# Patient Record
Sex: FEMALE | Race: WHITE | NOT HISPANIC OR LATINO | Employment: FULL TIME | URBAN - METROPOLITAN AREA
[De-identification: names, ages, dates, MRNs, and addresses within clinical notes are randomized per-mention and may not be internally consistent; named-entity substitution may affect disease eponyms.]

---

## 2018-08-28 ENCOUNTER — OFFICE VISIT - HEALTHEAST (OUTPATIENT)
Dept: FAMILY MEDICINE | Facility: CLINIC | Age: 58
End: 2018-08-28

## 2018-08-28 ENCOUNTER — COMMUNICATION - HEALTHEAST (OUTPATIENT)
Dept: TELEHEALTH | Facility: CLINIC | Age: 58
End: 2018-08-28

## 2018-08-28 DIAGNOSIS — R73.9 HYPERGLYCEMIA: ICD-10-CM

## 2018-08-28 DIAGNOSIS — E78.2 MIXED HYPERLIPIDEMIA: ICD-10-CM

## 2018-08-28 DIAGNOSIS — N95.2 ATROPHIC VAGINITIS: ICD-10-CM

## 2018-08-28 DIAGNOSIS — Z12.4 PAP SMEAR FOR CERVICAL CANCER SCREENING: ICD-10-CM

## 2018-08-28 DIAGNOSIS — Z72.0 TOBACCO USE: ICD-10-CM

## 2018-08-28 DIAGNOSIS — Z00.00 ROUTINE GENERAL MEDICAL EXAMINATION AT A HEALTH CARE FACILITY: ICD-10-CM

## 2018-08-28 LAB
ALBUMIN SERPL-MCNC: 4.3 G/DL (ref 3.5–5)
ALP SERPL-CCNC: 75 U/L (ref 45–120)
ALT SERPL W P-5'-P-CCNC: 20 U/L (ref 0–45)
ANION GAP SERPL CALCULATED.3IONS-SCNC: 8 MMOL/L (ref 5–18)
AST SERPL W P-5'-P-CCNC: 24 U/L (ref 0–40)
BILIRUB SERPL-MCNC: 0.5 MG/DL (ref 0–1)
BUN SERPL-MCNC: 9 MG/DL (ref 8–22)
CALCIUM SERPL-MCNC: 10.2 MG/DL (ref 8.5–10.5)
CHLORIDE BLD-SCNC: 105 MMOL/L (ref 98–107)
CHOLEST SERPL-MCNC: 252 MG/DL
CO2 SERPL-SCNC: 26 MMOL/L (ref 22–31)
CREAT SERPL-MCNC: 0.76 MG/DL (ref 0.6–1.1)
FASTING STATUS PATIENT QL REPORTED: YES
GFR SERPL CREATININE-BSD FRML MDRD: >60 ML/MIN/1.73M2
GLUCOSE BLD-MCNC: 92 MG/DL (ref 70–125)
HBA1C MFR BLD: 5.5 % (ref 3.5–6)
HDLC SERPL-MCNC: 65 MG/DL
LDLC SERPL CALC-MCNC: 173 MG/DL
POTASSIUM BLD-SCNC: 4.2 MMOL/L (ref 3.5–5)
PROT SERPL-MCNC: 7.3 G/DL (ref 6–8)
SODIUM SERPL-SCNC: 139 MMOL/L (ref 136–145)
TRIGL SERPL-MCNC: 71 MG/DL

## 2018-08-28 RX ORDER — ACETAMINOPHEN 160 MG/5ML
SUSPENSION, ORAL (FINAL DOSE FORM) ORAL
Status: SHIPPED | COMMUNITY
Start: 2014-07-15

## 2018-08-28 RX ORDER — NICOTINE 21 MG/24HR
1 PATCH, TRANSDERMAL 24 HOURS TRANSDERMAL EVERY 24 HOURS
Qty: 42 PATCH | Refills: 0 | Status: SHIPPED | OUTPATIENT
Start: 2018-08-28 | End: 2023-06-28

## 2018-08-28 RX ORDER — OMEGA-3-ACID ETHYL ESTERS 1 G/1
CAPSULE, LIQUID FILLED ORAL
Status: SHIPPED | COMMUNITY
Start: 2013-07-15

## 2018-08-28 RX ORDER — VITAMIN E 268 MG
400 CAPSULE ORAL
Status: SHIPPED | COMMUNITY
Start: 2014-07-15

## 2018-08-28 RX ORDER — ASPIRIN 81 MG/1
81 TABLET, CHEWABLE ORAL
Status: SHIPPED | COMMUNITY
Start: 2013-07-15

## 2018-08-28 RX ORDER — ASCORBIC ACID 500 MG
500 TABLET ORAL
Status: SHIPPED | COMMUNITY
Start: 2014-07-15

## 2018-08-28 ASSESSMENT — MIFFLIN-ST. JEOR: SCORE: 1343.33

## 2018-08-28 NOTE — ASSESSMENT & PLAN NOTE
Discussed etiology and treatment options. The patient is not interested in any hormone replacement, either topical or oral. She will start with regular use of vaginal moisturizer and lubricant with intercourse. I encouraged her to follow up if not improving and we should reconsider topical estrogen.

## 2018-08-28 NOTE — ASSESSMENT & PLAN NOTE
ASCVD 10 year risk 5%. The majority of this risk is related to her tobacco use. She is actively working on smoking cessation. Recommended Mediterranean style diet and recheck in one year.

## 2018-08-28 NOTE — ASSESSMENT & PLAN NOTE
The patient is interested in smoking cessation and she was congratulated on this decision. We discussed options for assistance in her efforts. She has decided on nicotine patches. When she stops smoking, she will start nicotine 21 mg patches daily for 6 weeks. Taper to 14 mg for 2-6 weeks, then 7 mg for 2-6 weeks, then off.

## 2018-08-29 LAB
HPV SOURCE: NORMAL
HUMAN PAPILLOMA VIRUS 16 DNA: NEGATIVE
HUMAN PAPILLOMA VIRUS 18 DNA: NEGATIVE
HUMAN PAPILLOMA VIRUS FINAL DIAGNOSIS: NORMAL
HUMAN PAPILLOMA VIRUS OTHER HR: NEGATIVE
SPECIMEN DESCRIPTION: NORMAL

## 2018-09-04 LAB
BKR LAB AP ABNORMAL BLEEDING: NO
BKR LAB AP BIRTH CONTROL/HORMONES: NORMAL
BKR LAB AP CERVICAL APPEARANCE: NORMAL
BKR LAB AP GYN ADEQUACY: NORMAL
BKR LAB AP GYN INTERPRETATION: NORMAL
BKR LAB AP HPV REFLEX: NORMAL
BKR LAB AP LMP: NORMAL
BKR LAB AP PATIENT STATUS: NORMAL
BKR LAB AP PREVIOUS ABNORMAL: NORMAL
BKR LAB AP PREVIOUS NORMAL: 2012
HIGH RISK?: NO
PATH REPORT.COMMENTS IMP SPEC: NORMAL
RESULT FLAG (HE HISTORICAL CONVERSION): NORMAL

## 2018-12-18 ENCOUNTER — COMMUNICATION - HEALTHEAST (OUTPATIENT)
Dept: FAMILY MEDICINE | Facility: CLINIC | Age: 58
End: 2018-12-18

## 2018-12-20 ENCOUNTER — COMMUNICATION - HEALTHEAST (OUTPATIENT)
Dept: FAMILY MEDICINE | Facility: CLINIC | Age: 58
End: 2018-12-20

## 2018-12-20 ENCOUNTER — AMBULATORY - HEALTHEAST (OUTPATIENT)
Dept: FAMILY MEDICINE | Facility: CLINIC | Age: 58
End: 2018-12-20

## 2018-12-20 DIAGNOSIS — Z12.31 ENCOUNTER FOR SCREENING MAMMOGRAM FOR BREAST CANCER: ICD-10-CM

## 2019-01-09 ENCOUNTER — COMMUNICATION - HEALTHEAST (OUTPATIENT)
Dept: FAMILY MEDICINE | Facility: CLINIC | Age: 59
End: 2019-01-09

## 2019-01-09 DIAGNOSIS — Z53.20 MAMMOGRAM DECLINED: ICD-10-CM

## 2019-04-05 ENCOUNTER — COMMUNICATION - HEALTHEAST (OUTPATIENT)
Dept: FAMILY MEDICINE | Facility: CLINIC | Age: 59
End: 2019-04-05

## 2021-06-01 VITALS — HEIGHT: 68 IN | BODY MASS INDEX: 24.55 KG/M2 | WEIGHT: 162 LBS

## 2021-06-16 PROBLEM — E78.2 MIXED HYPERLIPIDEMIA: Status: ACTIVE | Noted: 2018-09-03

## 2021-06-16 PROBLEM — Z72.0 TOBACCO USE: Status: ACTIVE | Noted: 2018-08-28

## 2021-06-16 PROBLEM — N95.2 ATROPHIC VAGINITIS: Status: ACTIVE | Noted: 2018-09-03

## 2021-06-16 PROBLEM — Z53.20 MAMMOGRAM DECLINED: Status: ACTIVE | Noted: 2019-01-10

## 2021-06-19 NOTE — LETTER
Letter by Jovita Galindo MD at      Author: Jovita Galindo MD Service: -- Author Type: --    Filed:  Encounter Date: 4/5/2019 Status: (Other)         Loren Gilliland   [address] [place] [address]      4/5/2019       Dear Loren,       In reviewing your records, we have determined a gap in your preventive services. Based on your age and health history, we recommend the follow service.       ? Mammogram        If you have had the service elsewhere, please contact us so we can update our records. Please let us know if you have transferred your care to another clinic.    Please call [phone] to schedule this appointment.    We believe that a strong preventive care program, including regular physicals and follow-up care is an important part of a healthy lifestyle and we are committed to helping you maintain your health.    Thank you for choosing us as your health care provider.    Sincerely,     [facility]

## 2021-06-20 NOTE — PROGRESS NOTES
FEMALE PREVENTATIVE EXAM    Assessment and Plan:     Problem List Items Addressed This Visit     Atrophic vaginitis     Discussed etiology and treatment options. The patient is not interested in any hormone replacement, either topical or oral. She will start with regular use of vaginal moisturizer and lubricant with intercourse. I encouraged her to follow up if not improving and we should reconsider topical estrogen.         Tobacco use     The patient is interested in smoking cessation and she was congratulated on this decision. We discussed options for assistance in her efforts. She has decided on nicotine patches. When she stops smoking, she will start nicotine 21 mg patches daily for 6 weeks. Taper to 14 mg for 2-6 weeks, then 7 mg for 2-6 weeks, then off.         Relevant Medications    nicotine (NICODERM CQ) 21 mg/24 hr    nicotine (NICODERM CQ) 14 mg/24 hr    nicotine (NICODERM CQ) 7 mg/24 hr    Other Relevant Orders    Pneumococcal conjugate vaccine 13-valent 6wks-17yrs; >50yrs (Completed)      Other Visit Diagnoses     Routine general medical examination at a health care facility    -  Primary    Relevant Orders    Lipid Pope FASTING (Completed)    Pap smear for cervical cancer screening        Relevant Orders    Gynecologic Cytology (PAP Smear)    HPV High Risk DNA Cervical (Completed)    Hyperglycemia        Relevant Orders    Comprehensive Metabolic Panel (Completed)    Glycosylated Hemoglobin A1c (Completed)        Patient Instructions   1) We will notify you of lab results.  Consider signing up for my chart.  2) Start nicotine patches 21 mg daily for 6 weeks, then 14 mg daily for 2-6 weeks, then 7 mg daily for 2-6 weeks, then off.  3) Please let me know if you would like to have a mammogram.  4) Replense vaginal moisturizer daily.  Continue to use KY jelly with intercourse.     Subjective:   HPI:  Loren Gilliland is a 59 yo female who presents for a preventive health visit. The patient is also  complaining of pain with intercourse since she went through menopause around age 49. She reports that she has dryness and discomfort. No bleeding with intercourse.Artificial lubricants have been somewhat helpful.     The patient would also like to quit smoking. She is smoking about 1 1/2 ppd. She was able to quit successfully about two years ago. She did the patch at that time. She has signed up with the Quit plan and is finding the support helpful.      Patient Active Problem List   Diagnosis     Tobacco use     Atrophic vaginitis      No past medical history on file.   Past Surgical History:   Procedure Laterality Date     RI REMOVAL OF TONSILS,<13 Y/O      Description: Tonsillectomy;  Recorded: 12/09/2013;        Current Outpatient Prescriptions:      albuterol (PROAIR HFA;PROVENTIL HFA;VENTOLIN HFA) 90 mcg/actuation inhaler, Inhale 1-2 puffs., Disp: , Rfl:      ascorbic acid, vitamin C, (VITAMIN C) 500 MG tablet, Take 500 mg by mouth., Disp: , Rfl:      aspirin 81 mg chewable tablet, Chew 81 mg., Disp: , Rfl:      calcium-vitamin D (CALCIUM-VITAMIN D) 500 mg(1,250mg) -200 unit per tablet, Take 1 tablet by mouth 2 (two) times a day with meals., Disp: , Rfl:      cholecalciferol, vitamin D3, 1,000 unit tablet, Take 1,000 Units by mouth., Disp: , Rfl:      ginkgo biloba leaf extract 60 mg Tab, Take by mouth., Disp: , Rfl:      multivitamin (ONE A DAY) per tablet, Take 1 tablet by mouth., Disp: , Rfl:      omega-3 acid ethyl esters (LOVAZA) 1 gram capsule, Take by mouth., Disp: , Rfl:      pyridoxine, vitamin B6, (B-6) 100 MG tablet, Take 50 mg by mouth., Disp: , Rfl:      vitamin E 400 UNIT capsule, Take 400 Units by mouth., Disp: , Rfl:       Review of Systems   Constitutional: Negative for activity change, appetite change, fever and unexpected weight change.   HENT: Negative for congestion, hearing loss and sore throat.    Eyes: Negative for discharge and visual disturbance.   Respiratory: Negative for cough,  "shortness of breath and wheezing.    Cardiovascular: Negative for chest pain, palpitations and leg swelling.   Gastrointestinal: Negative for abdominal pain, blood in stool, constipation, diarrhea and vomiting.   Endocrine: Negative for polydipsia and polyuria.   Genitourinary: Negative for decreased urine volume, difficulty urinating, dysuria, frequency, hematuria and urgency.   Musculoskeletal: Negative for arthralgias, gait problem and myalgias.   Skin: Negative for rash.   Allergic/Immunologic: Negative for immunocompromised state.   Neurological: Negative for weakness.   Hematological: Does not bruise/bleed easily.   Psychiatric/Behavioral: Negative for dysphoric mood and sleep disturbance. The patient is not nervous/anxious.        Objective:   Vital Signs:   /64 (Patient Site: Left Arm, Patient Position: Sitting, Cuff Size: Adult Regular)  Pulse 81  Temp 98.1  F (36.7  C) (Oral)   Resp 16  Ht 5' 8\" (1.727 m)  Wt 162 lb (73.5 kg)  SpO2 96%  Breastfeeding? No  BMI 24.63 kg/m2     PHYSICAL EXAM  Physical Exam   Constitutional: She is oriented to person, place, and time. She appears well-developed and well-nourished. No distress.   HENT:   Right Ear: Tympanic membrane and ear canal normal.   Left Ear: Tympanic membrane and ear canal normal.   Mouth/Throat: Oropharynx is clear and moist.   Eyes: Conjunctivae and EOM are normal. Pupils are equal, round, and reactive to light.   Neck: Normal range of motion. Neck supple. No thyromegaly present.   Cardiovascular: Normal rate and regular rhythm.    No murmur heard.  Pulmonary/Chest: Effort normal and breath sounds normal. No respiratory distress. She has no wheezes. She has no rhonchi. Right breast exhibits no inverted nipple, no mass and no skin change. Left breast exhibits no inverted nipple, no mass and no skin change.   Abdominal: Soft. Bowel sounds are normal. She exhibits no distension and no mass. There is no hepatosplenomegaly. There is no " tenderness. Hernia confirmed negative in the ventral area.   Genitourinary: Uterus normal. Cervix exhibits no discharge and no friability. Right adnexum displays no mass. Left adnexum displays no mass.   Genitourinary Comments: Generalized thinning and atrophy of vaginal mucosa.  No lesions present.   Lymphadenopathy:     She has no cervical adenopathy.   Neurological: She is alert and oriented to person, place, and time. No cranial nerve deficit. Gait normal.   Reflex Scores:       Patellar reflexes are 2+ on the right side and 2+ on the left side.  Skin: Skin is warm. No rash noted.   Psychiatric: She has a normal mood and affect. Her behavior is normal. Judgment and thought content normal.

## 2021-06-22 NOTE — TELEPHONE ENCOUNTER
"Radiology schedulers spoke with patient on 12/21 to assist with scheduling her mammogram. Loren informed them that she \"does not do mammograms\", and that she would that removed from her medical record.  "

## 2021-08-22 ENCOUNTER — HEALTH MAINTENANCE LETTER (OUTPATIENT)
Age: 61
End: 2021-08-22

## 2021-10-17 ENCOUNTER — HEALTH MAINTENANCE LETTER (OUTPATIENT)
Age: 61
End: 2021-10-17

## 2021-12-14 ENCOUNTER — IMMUNIZATION (OUTPATIENT)
Dept: NURSING | Facility: CLINIC | Age: 61
End: 2021-12-14
Payer: COMMERCIAL

## 2021-12-14 PROCEDURE — 91300 PR COVID VAC PFIZER DIL RECON 30 MCG/0.3 ML IM: CPT

## 2021-12-14 PROCEDURE — 0004A PR COVID VAC PFIZER DIL RECON 30 MCG/0.3 ML IM: CPT

## 2021-12-30 ENCOUNTER — IMMUNIZATION (OUTPATIENT)
Dept: FAMILY MEDICINE | Facility: CLINIC | Age: 61
End: 2021-12-30
Payer: COMMERCIAL

## 2021-12-30 PROCEDURE — 90471 IMMUNIZATION ADMIN: CPT

## 2021-12-30 PROCEDURE — 90682 RIV4 VACC RECOMBINANT DNA IM: CPT

## 2022-01-23 ENCOUNTER — OFFICE VISIT (OUTPATIENT)
Dept: FAMILY MEDICINE | Facility: CLINIC | Age: 62
End: 2022-01-23
Payer: COMMERCIAL

## 2022-01-23 VITALS
WEIGHT: 161 LBS | DIASTOLIC BLOOD PRESSURE: 92 MMHG | RESPIRATION RATE: 18 BRPM | OXYGEN SATURATION: 99 % | TEMPERATURE: 97.5 F | SYSTOLIC BLOOD PRESSURE: 145 MMHG | HEART RATE: 71 BPM | BODY MASS INDEX: 24.48 KG/M2

## 2022-01-23 DIAGNOSIS — S62.101A CLOSED FRACTURE OF RIGHT WRIST, INITIAL ENCOUNTER: Primary | ICD-10-CM

## 2022-01-23 PROCEDURE — 99214 OFFICE O/P EST MOD 30 MIN: CPT | Performed by: FAMILY MEDICINE

## 2022-01-23 NOTE — PROGRESS NOTES
Assessment & Plan     Closed fracture of right wrist, initial encounter    - XR Wrist Right G/E 3 Views; Future  - Wrist/Arm/Hand Supplies Order for DME - ONLY FOR DME  - Orthopedic  Referral; Future    Xrays reviewed showing fracture of the radial metaphysis and ulnar styloid avulsion fx.  Placed in wrist splint today.  ORTHO follow up for casting.  Continue with REST/ICE/COMPRESSION and ELEVATION.  NSAIDs/Tylenol prn pain.    Brooke Yeh MD  [facility]    Alvin Pimentel is a 61 year old who presents for the following health issues     HPI     Tripped today while bringing in groceries.  Landed on RIGHT hand to catch fall.  Pain over RIGHT wrist with supination and pronation.  Mild swelling.  Here with .      Review of Systems   Constitutional, HEENT, cardiovascular, pulmonary, GI, , musculoskeletal, neuro, skin, endocrine and psych systems are negative, except as otherwise noted.      Objective    BP (!) 145/92   Pulse 71   Temp 97.5  F (36.4  C) (Oral)   Resp 18   Wt 73 kg (161 lb)   SpO2 99%   BMI 24.48 kg/m    Body mass index is 24.48 kg/m .  Physical Exam   GENERAL: healthy, alert and no distress  EYES: Eyes grossly normal to inspection, PERRL and conjunctivae and sclerae normal  MS: no gross musculoskeletal defects noted, mild swelling over RIGHT wrist over distal head of radius.  Pain when pronates and supinates the wrist and dorsiflexes.  SKIN: no suspicious lesions or rashes  PSYCH: mentation appears normal, affect normal/bright    Xray    EXAM: XR WRIST RIGHT G/E 3 VIEWS  LOCATION: [facility]  DATE/TIME: 01/23/2022, 3:13 PM     INDICATION: Right wrist pain.  COMPARISON: None.                                                                      IMPRESSION: There is a transverse fracture of the distal radial metaphysis with mild dorsal impaction. There is an ulnar styloid avulsion fracture as well.

## 2022-01-23 NOTE — PROGRESS NOTES
{PROVIDER CHARTING PREFERENCE:915599}    Alvin Pimentel is a 61 year old who presents for the following health issues {ACCOMPANIED BY STATEMENT (Optional):592413}    HPI     {SUPERLIST (Optional):178074}  {additonal problems for provider to add (Optional):189561}    Review of Systems   {ROS COMP (Optional):992193}      Objective    BP (!) 145/92   Pulse 71   Temp 97.5  F (36.4  C) (Oral)   Resp 18   Wt 73 kg (161 lb)   SpO2 99%   BMI 24.48 kg/m    Body mass index is 24.48 kg/m .  Physical Exam   {Exam List (Optional):171611}    {Diagnostic Test Results (Optional):811109}    {AMBULATORY ATTESTATION (Optional):224167}

## 2022-01-26 ENCOUNTER — TRANSFERRED RECORDS (OUTPATIENT)
Dept: HEALTH INFORMATION MANAGEMENT | Facility: CLINIC | Age: 62
End: 2022-01-26

## 2022-02-02 ENCOUNTER — TRANSFERRED RECORDS (OUTPATIENT)
Dept: HEALTH INFORMATION MANAGEMENT | Facility: CLINIC | Age: 62
End: 2022-02-02

## 2022-02-16 ENCOUNTER — TRANSFERRED RECORDS (OUTPATIENT)
Dept: HEALTH INFORMATION MANAGEMENT | Facility: CLINIC | Age: 62
End: 2022-02-16

## 2022-02-23 ENCOUNTER — TRANSFERRED RECORDS (OUTPATIENT)
Dept: HEALTH INFORMATION MANAGEMENT | Facility: CLINIC | Age: 62
End: 2022-02-23

## 2022-03-09 ENCOUNTER — TRANSFERRED RECORDS (OUTPATIENT)
Dept: HEALTH INFORMATION MANAGEMENT | Facility: CLINIC | Age: 62
End: 2022-03-09

## 2022-08-09 ENCOUNTER — IMMUNIZATION (OUTPATIENT)
Dept: NURSING | Facility: CLINIC | Age: 62
End: 2022-08-09
Payer: COMMERCIAL

## 2022-08-09 ENCOUNTER — TELEPHONE (OUTPATIENT)
Dept: NURSING | Facility: CLINIC | Age: 62
End: 2022-08-09

## 2022-08-09 PROCEDURE — 91305 COVID-19,PF,PFIZER (12+ YRS): CPT

## 2022-08-09 PROCEDURE — 0054A COVID-19,PF,PFIZER (12+ YRS): CPT

## 2022-08-09 NOTE — TELEPHONE ENCOUNTER
Telephone call  Patient calling to find out if she had been exposed to covid her  was exposed at [facility] .  Explained he should test in 5-7 days unless he comes down with symptoms before that.  She decided she needed the booster  So transferred her to scheduling for a appointment.    Roslyn Canas RN   [facility] Nurse Advisor  9:46 AM 8/9/2022    COVID 19 Nurse Triage Plan/Patient Instructions    Please be aware that novel coronavirus (COVID-19) may be circulating in the community. If you develop symptoms such as fever, cough, or SOB or if you have concerns about the presence of another infection including coronavirus (COVID-19), please contact your health care provider or visit [email or web address].     Disposition/Instructions    Home care recommended. Follow home care protocol based instructions.    Thank you for taking steps to prevent the spread of this virus.  o Limit your contact with others.  o Wear a simple mask to cover your cough.  o Wash your hands well and often.    Resources    [facility]: About COVID-19: [email or web address]    CDC: What to Do If You're Sick: www.cdc.gov/coronavirus/2019-ncov/about/steps-when-sick.html    CDC: Ending Home Isolation: www.cdc.gov/coronavirus/2019-ncov/hcp/disposition-in-home-patients.html     CDC: Caring for Someone: www.cdc.gov/coronavirus/2019-ncov/if-you-are-sick/care-for-someone.html     [facility]: Interim Guidance for Hospital Discharge to Home: www.health.[facility].us/diseases/coronavirus/hcp/hospdischarge.pdf    [facility] clinical trials (COVID-19 research studies): [email or web address]-clinical-trials     Below are the COVID-19 hotlines at the [facility] ([facility]). Interpreters are available.   o For health questions: Call 528-854-0030 or 1-783.578.5485 (7 a.m. to 7 p.m.)  o For questions about schools and childcare: Call 520-534-5766 or 1-926.545.2800 (7 a.m. to 7 p.m.)

## 2022-10-02 ENCOUNTER — HEALTH MAINTENANCE LETTER (OUTPATIENT)
Age: 62
End: 2022-10-02

## 2023-03-01 ENCOUNTER — NURSE TRIAGE (OUTPATIENT)
Dept: FAMILY MEDICINE | Facility: CLINIC | Age: 63
End: 2023-03-01
Payer: COMMERCIAL

## 2023-03-01 NOTE — TELEPHONE ENCOUNTER
03/01/23  COVID Positive/Requesting COVID treatment    Patient is positive for COVID and requesting treatment options.    Date of positive COVID test (PCR or at home)? 02/28/23  Current COVID symptoms: - fever or chills  - cough  - muscle or body aches  Date COVID symptoms began: 02/26/23    Message should be routed to clinic RN pool. Best phone number to use for call back: [phone]       Pt states she has no PCP but [facility] her primary clinic as she has been seen therer for vaccinations in the past.

## 2023-03-01 NOTE — TELEPHONE ENCOUNTER
S-(situation):   Pt had positive COVID test Tuesday, 02/28/2023. Pt would like advise and to know more about antiviral therapy.    B-(background):   02/26/2023 Symptom onset    A-(assessment):   Cough, mild, productive intermittently, white sputum  Body aches, moderate, constant. Taking Advil, effective.  T98.6F, oral. Highest temp in 24 hours 99.8F, oral.  Fatigue, taking lots of naps, consistent    Denies SOB, difficulty breathing, wheezing, nausea, vomiting, diarrhea, sore throat, loss of taste or smell    R-(recommendations):   Home care. Care advice given. Provided education on paxlovid. Pt would like to continue with home care. Noted pt's window for paxlovid would be Friday, 03/03/2023. Pt will call back if sx worsen or if they want to be considered for paxlovid.    Kate Brady RN    Reason for Disposition    [1] COVID-19 diagnosed by positive lab test (e.g., PCR, rapid self-test kit) AND [2] mild symptoms (e.g., cough, fever, others) AND [3] no complications or SOB    Additional Information    Negative: [1] Diagnosed or suspected COVID-19 AND [2] symptoms lasting 3 or more weeks    Negative: [1] COVID-19 exposure AND [2] no symptoms    Negative: COVID-19 vaccine reaction suspected (e.g., fever, headache, muscle aches) occurring 1 to 3 days after getting vaccine    Negative: COVID-19 vaccine, questions about    Negative: [1] Lives with someone known to have influenza (flu test positive) AND [2] flu-like symptoms (e.g., cough, runny nose, sore throat, SOB; with or without fever)    Negative: [1] Adult with possible COVID-19 symptoms AND [2] triager concerned about severity of symptoms or other causes    Negative: COVID-19 and breastfeeding, questions about    Negative: SEVERE difficulty breathing (e.g., struggling for each breath, speaks in single words)    Negative: Difficult to awaken or acting confused (e.g., disoriented, slurred speech)    Negative: Bluish (or gray) lips or face now    Negative:  Shock suspected (e.g., cold/pale/clammy skin, too weak to stand, low BP, rapid pulse)    Negative: Sounds like a life-threatening emergency to the triager    Negative: SEVERE or constant chest pain or pressure  (Exception: Mild central chest pain, present only when coughing.)    Negative: MODERATE difficulty breathing (e.g., speaks in phrases, SOB even at rest, pulse 100-120)    Negative: Headache and stiff neck (can't touch chin to chest)    Negative: Oxygen level (e.g., pulse oximetry) 90 percent or lower    Negative: Chest pain or pressure  (Exception: MILD central chest pain, present only when coughing)    Negative: Patient sounds very sick or weak to the triager    Negative: MILD difficulty breathing (e.g., minimal/no SOB at rest, SOB with walking, pulse <100)    Negative: Fever > 103 F (39.4 C)    Negative: [1] Fever > 101 F (38.3 C) AND [2] over 60 years of age    Negative: [1] Fever > 100.0 F (37.8 C) AND [2] bedridden (e.g., nursing home patient, CVA, chronic illness, recovering from surgery)    Negative: [1] HIGH RISK for severe COVID complications (e.g., weak immune system, age > 64 years, obesity with BMI of 30 or higher, pregnant, chronic lung disease or other chronic medical condition) AND [2] COVID symptoms (e.g., cough, fever)  (Exceptions: Already seen by PCP and no new or worsening symptoms.)    Negative: [1] HIGH RISK patient AND [2] influenza is widespread in the community AND [3] ONE OR MORE respiratory symptoms: cough, sore throat, runny or stuffy nose    Negative: [1] HIGH RISK patient AND [2] influenza exposure within the last 7 days AND [3] ONE OR MORE respiratory symptoms: cough, sore throat, runny or stuffy nose    Negative: Oxygen level (e.g., pulse oximetry) 91 to 94 percent    Negative: [1] COVID-19 infection suspected by caller or triager AND [2] mild symptoms (cough, fever, or others) AND [3] negative COVID-19 rapid test    Negative: Fever present > 3 days (72 hours)    Negative: [1]  "Fever returns after gone for over 24 hours AND [2] symptoms worse or not improved    Negative: [1] Continuous (nonstop) coughing interferes with work or school AND [2] no improvement using cough treatment per Care Advice    Negative: Cough present > 3 weeks    Negative: [1] COVID-19 diagnosed by positive lab test (e.g., PCR, rapid self-test kit) AND [2] NO symptoms (e.g., cough, fever, others)    Answer Assessment - Initial Assessment Questions  1. COVID-19 DIAGNOSIS: \"Who made your COVID-19 diagnosis?\" \"Was it confirmed by a positive lab test or self-test?\" If not diagnosed by a doctor (or NP/PA), ask \"Are there lots of cases (community spread) where you live?\" Note: See [facility] health department website, if unsure.      02/28/2023, home test positive  2. COVID-19 EXPOSURE: \"Was there any known exposure to COVID before the symptoms began?\" CDC Definition of close contact: within 6 feet (2 meters) for a total of 15 minutes or more over a 24-hour period.         3. ONSET: \"When did the COVID-19 symptoms start?\"       02/26/2023 afternoon  4. WORST SYMPTOM: \"What is your worst symptom?\" (e.g., cough, fever, shortness of breath, muscle aches)      Body aches, and fatigue  5. COUGH: \"Do you have a cough?\" If Yes, ask: \"How bad is the cough?\"        Cough, mild, productive, white sputum  6. FEVER: \"Do you have a fever?\" If Yes, ask: \"What is your temperature, how was it measured, and when did it start?\"      99.8F, oral  7. RESPIRATORY STATUS: \"Describe your breathing?\" (e.g., shortness of breath, wheezing, unable to speak)       denies  8. BETTER-SAME-WORSE: \"Are you getting better, staying the same or getting worse compared to yesterday?\"  If getting worse, ask, \"In what way?\"      Same as yesterday  9. HIGH RISK DISEASE: \"Do you have any chronic medical problems?\" (e.g., asthma, heart or lung disease, weak immune system, obesity, etc.)      denies  10. VACCINE: \"Have you had the COVID-19 vaccine?\" If Yes, ask: " "\"Which one, how many shots, when did you get it?\"        Yes, pfizer  11. BOOSTER: \"Have you received your COVID-19 booster?\" If Yes, ask: \"Which one and when did you get it?\"        Yes, pfizer  12. PREGNANCY: \"Is there any chance you are pregnant?\" \"When was your last menstrual period?\"        postmenopausal  13. OTHER SYMPTOMS: \"Do you have any other symptoms?\"  (e.g., chills, fatigue, headache, loss of smell or taste, muscle pain, sore throat)        Fever, body aches, fatigue  14. O2 SATURATION MONITOR:  \"Do you use an oxygen saturation monitor (pulse oximeter) at home?\" If Yes, ask \"What is your reading (oxygen level) today?\" \"What is your usual oxygen saturation reading?\" (e.g., 95%)        no    Protocols used: CORONAVIRUS (COVID-19) DIAGNOSED OR [id number]      "

## 2023-06-28 ENCOUNTER — OFFICE VISIT (OUTPATIENT)
Dept: FAMILY MEDICINE | Facility: CLINIC | Age: 63
End: 2023-06-28
Payer: COMMERCIAL

## 2023-06-28 VITALS
WEIGHT: 164 LBS | OXYGEN SATURATION: 98 % | SYSTOLIC BLOOD PRESSURE: 137 MMHG | BODY MASS INDEX: 24.94 KG/M2 | HEART RATE: 60 BPM | DIASTOLIC BLOOD PRESSURE: 81 MMHG

## 2023-06-28 DIAGNOSIS — Z12.31 VISIT FOR SCREENING MAMMOGRAM: ICD-10-CM

## 2023-06-28 DIAGNOSIS — Z72.0 TOBACCO USE: ICD-10-CM

## 2023-06-28 DIAGNOSIS — L82.1 SEBORRHEIC KERATOSES: ICD-10-CM

## 2023-06-28 DIAGNOSIS — J40 BRONCHITIS: Primary | ICD-10-CM

## 2023-06-28 PROCEDURE — 90471 IMMUNIZATION ADMIN: CPT | Performed by: FAMILY MEDICINE

## 2023-06-28 PROCEDURE — 90715 TDAP VACCINE 7 YRS/> IM: CPT | Performed by: FAMILY MEDICINE

## 2023-06-28 PROCEDURE — 17110 DESTRUCTION B9 LES UP TO 14: CPT | Performed by: FAMILY MEDICINE

## 2023-06-28 PROCEDURE — 90677 PCV20 VACCINE IM: CPT | Performed by: FAMILY MEDICINE

## 2023-06-28 PROCEDURE — 99213 OFFICE O/P EST LOW 20 MIN: CPT | Mod: 25 | Performed by: FAMILY MEDICINE

## 2023-06-28 PROCEDURE — 90472 IMMUNIZATION ADMIN EACH ADD: CPT | Performed by: FAMILY MEDICINE

## 2023-06-28 RX ORDER — ALBUTEROL SULFATE 90 UG/1
2 AEROSOL, METERED RESPIRATORY (INHALATION) EVERY 6 HOURS PRN
Qty: 18 G | Refills: 0 | Status: SHIPPED | OUTPATIENT
Start: 2023-06-28 | End: 2023-07-24

## 2023-06-28 RX ORDER — ALBUTEROL SULFATE 90 UG/1
2 AEROSOL, METERED RESPIRATORY (INHALATION) EVERY 6 HOURS PRN
COMMUNITY
End: 2023-06-28

## 2023-06-28 ASSESSMENT — PATIENT HEALTH QUESTIONNAIRE - PHQ9
SUM OF ALL RESPONSES TO PHQ QUESTIONS 1-9: 0
SUM OF ALL RESPONSES TO PHQ QUESTIONS 1-9: 0

## 2023-06-28 NOTE — PROGRESS NOTES
Assessment & Plan   Problem List Items Addressed This Visit        Musculoskeletal and Integumentary    Seborrheic keratoses     The patient has multiple relatively large seborrheic keratoses on her upper back and left upper arm.  These were treated with liquid nitrogen x3 deep freezes today as they are all quite itchy and bothersome to the patient.            Behavioral    Tobacco use     The patient reports that she is significantly cutting down with an intention to ultimately quit smoking.        Other Visit Diagnoses     Bronchitis    -  Primary    Relevant Medications    albuterol (PROAIR HFA/PROVENTIL HFA/VENTOLIN HFA) 108 (90 Base) MCG/ACT inhaler    Visit for screening mammogram        Relevant Orders    MA SCREENING DIGITAL BILAT - Future  (s+30)         The patient has gradually improving although persistent cough 3 weeks after what sounds like a viral upper respiratory illness.  She has a normal heart and lung exam today and was reassured that this is likely simply persistent inflammation of the bronchioles following infection.  I did refill her albuterol which she could try taking as needed considering she has had some relief and this certainly could be bronchitis.  I counseled the patient regarding smoking cessation as well as a number of preventive care opportunities that she is due for.  We assisted the patient in securing an appointment for her to follow-up for an annual physical exam in the near future.  I also placed an order for a mammogram and she received both an Adacel vaccine today as well as pneumonia.  [id number]     Nicotine/Tobacco Cessation:  She reports that she has been smoking cigarettes and cigarettes. She has been smoking an average of 1.5 packs per day. She has never used smokeless tobacco.  Nicotine/Tobacco Cessation Plan:       NOY UMANZOR MD  [facility]    Alvin Pimentel is a 63 year old presents today with a concern regarding a cough.  She  "states that this actually started almost 3 weeks ago.  She was feeling quite ill at that time and tried to make an appointment but was not able to get an appointment until today.  She states that her  was ill with upper respiratory symptoms and a cold similar and continues to cough as well.  The patient does have a history of tobacco use although has been trying to quit.  The patient states that initially she had significant nasal congestion as well as drainage and sinus pain and pressure.  She then had resolution of those symptoms but persistent cough.  The cough has gradually been getting better although continues at night to some extent.  She was given an albuterol inhaler in the past for bronchitis and tried using that and found it was helpful.  She is requesting a refill on that today.  The patient also notes that her sister was recently diagnosed with breast cancer.  Lastly, the patient does have some \"moles\" that she would like me to take a look at today.  They have been bothersome and quite itchy.      cold cough sinus issues for 2 weeks        6/28/2023     8:53 AM   Additional Questions   Roomed by as   Accompanied by self         6/28/2023     8:53 AM   Patient Reported Additional Medications   Patient reports taking the following new medications no     History of Present Illness       Reason for visit:  Really bad cold for over 2 weeks & check up  Symptom onset:  3-4 weeks ago  Symptoms include:  Runny nose headaches chills cough  Symptom intensity:  Severe  Symptom progression:  Improving  Had these symptoms before:  Yes  Has tried/received treatment for these symptoms:  Yes  Previous treatment was successful:  Yes  Prior treatment description:  Treated for broncitis  What makes it better:  Inhaller that I got before    She eats 2-3 servings of fruits and vegetables daily.She consumes 0 sweetened beverage(s) daily.She exercises with enough effort to increase her heart rate 30 to 60 minutes per " day.  She exercises with enough effort to increase her heart rate 6 days per week.   She is taking medications regularly.    Today's PHQ-9         PHQ-9 Total Score: 0    PHQ-9 Q9 Thoughts of better off dead/self-harm past 2 weeks :   Not at all               Objective    /81 (BP Location: Left arm, Patient Position: Left side, Cuff Size: Adult Large)   Pulse 60   Wt 74.4 kg (164 lb)   SpO2 98%   BMI 24.94 kg/m    Body mass index is 24.94 kg/m .  Physical Exam   GENERAL: healthy, alert and no distress  RESP: lungs clear to auscultation - no rales, rhonchi or wheezes  CV: regular rate and rhythm, normal S1 S2, no S3 or S4, no murmur, click or rub, no peripheral edema and peripheral pulses strong  SKIN: The patient has 3 smaller seborrheic keratoses on her upper back and 2 larger ones on her upper arm, left.  These were treated with liquid nitrogen x3 deep freezes today.

## 2023-06-28 NOTE — ASSESSMENT & PLAN NOTE
The patient reports that she is significantly cutting down with an intention to ultimately quit smoking.

## 2023-06-28 NOTE — ASSESSMENT & PLAN NOTE
The patient has multiple relatively large seborrheic keratoses on her upper back and left upper arm.  These were treated with liquid nitrogen x3 deep freezes today as they are all quite itchy and bothersome to the patient.

## 2023-07-24 ENCOUNTER — LAB (OUTPATIENT)
Dept: FAMILY MEDICINE | Facility: CLINIC | Age: 63
End: 2023-07-24

## 2023-07-24 ENCOUNTER — OFFICE VISIT (OUTPATIENT)
Dept: FAMILY MEDICINE | Facility: CLINIC | Age: 63
End: 2023-07-24
Payer: COMMERCIAL

## 2023-07-24 VITALS
HEART RATE: 59 BPM | SYSTOLIC BLOOD PRESSURE: 118 MMHG | HEIGHT: 68 IN | BODY MASS INDEX: 24.25 KG/M2 | RESPIRATION RATE: 16 BRPM | OXYGEN SATURATION: 99 % | DIASTOLIC BLOOD PRESSURE: 76 MMHG | TEMPERATURE: 98.1 F | WEIGHT: 160 LBS

## 2023-07-24 DIAGNOSIS — Z12.11 SCREEN FOR COLON CANCER: ICD-10-CM

## 2023-07-24 DIAGNOSIS — Z13.0 SCREENING FOR DEFICIENCY ANEMIA: ICD-10-CM

## 2023-07-24 DIAGNOSIS — Z12.4 CERVICAL CANCER SCREENING: ICD-10-CM

## 2023-07-24 DIAGNOSIS — Z72.0 TOBACCO USE: ICD-10-CM

## 2023-07-24 DIAGNOSIS — Z13.29 SCREENING FOR THYROID DISORDER: ICD-10-CM

## 2023-07-24 DIAGNOSIS — Z11.4 SCREENING FOR HIV (HUMAN IMMUNODEFICIENCY VIRUS): ICD-10-CM

## 2023-07-24 DIAGNOSIS — Z00.00 ROUTINE GENERAL MEDICAL EXAMINATION AT A HEALTH CARE FACILITY: Primary | ICD-10-CM

## 2023-07-24 DIAGNOSIS — E78.2 MIXED HYPERLIPIDEMIA: ICD-10-CM

## 2023-07-24 DIAGNOSIS — Z11.59 NEED FOR HEPATITIS C SCREENING TEST: ICD-10-CM

## 2023-07-24 DIAGNOSIS — Z13.228 SCREENING FOR METABOLIC DISORDER: ICD-10-CM

## 2023-07-24 DIAGNOSIS — Z13.1 SCREENING FOR DIABETES MELLITUS: ICD-10-CM

## 2023-07-24 PROBLEM — N95.2 ATROPHIC VAGINITIS: Status: RESOLVED | Noted: 2018-09-03 | Resolved: 2023-07-24

## 2023-07-24 PROBLEM — Z53.20 MAMMOGRAM DECLINED: Status: RESOLVED | Noted: 2019-01-10 | Resolved: 2023-07-24

## 2023-07-24 LAB
ALBUMIN SERPL BCG-MCNC: 4.8 G/DL (ref 3.5–5.2)
ALP SERPL-CCNC: 63 U/L (ref 35–104)
ALT SERPL W P-5'-P-CCNC: 20 U/L (ref 0–50)
ANION GAP SERPL CALCULATED.3IONS-SCNC: 13 MMOL/L (ref 7–15)
AST SERPL W P-5'-P-CCNC: 35 U/L (ref 0–45)
BILIRUB SERPL-MCNC: 0.3 MG/DL
BUN SERPL-MCNC: 15.1 MG/DL (ref 8–23)
CALCIUM SERPL-MCNC: 10.4 MG/DL (ref 8.8–10.2)
CHLORIDE SERPL-SCNC: 102 MMOL/L (ref 98–107)
CHOLEST SERPL-MCNC: 238 MG/DL
CREAT SERPL-MCNC: 0.9 MG/DL (ref 0.51–0.95)
DEPRECATED HCO3 PLAS-SCNC: 25 MMOL/L (ref 22–29)
ERYTHROCYTE [DISTWIDTH] IN BLOOD BY AUTOMATED COUNT: 13.1 % (ref 10–15)
GFR SERPL CREATININE-BSD FRML MDRD: 71 ML/MIN/1.73M2
GLUCOSE SERPL-MCNC: 92 MG/DL (ref 70–99)
HBA1C MFR BLD: 5.5 % (ref 0–5.6)
HCT VFR BLD AUTO: 39.8 % (ref 35–47)
HDLC SERPL-MCNC: 67 MG/DL
HGB BLD-MCNC: 13.6 G/DL (ref 11.7–15.7)
LDLC SERPL CALC-MCNC: 152 MG/DL
MCH RBC QN AUTO: 31.8 PG (ref 26.5–33)
MCHC RBC AUTO-ENTMCNC: 34.2 G/DL (ref 31.5–36.5)
MCV RBC AUTO: 93 FL (ref 78–100)
NONHDLC SERPL-MCNC: 171 MG/DL
PLATELET # BLD AUTO: 265 10E3/UL (ref 150–450)
POTASSIUM SERPL-SCNC: 4 MMOL/L (ref 3.4–5.3)
PROT SERPL-MCNC: 7.8 G/DL (ref 6.4–8.3)
RBC # BLD AUTO: 4.28 10E6/UL (ref 3.8–5.2)
SODIUM SERPL-SCNC: 140 MMOL/L (ref 136–145)
TRIGL SERPL-MCNC: 94 MG/DL
TSH SERPL DL<=0.005 MIU/L-ACNC: 0.96 UIU/ML (ref 0.3–4.2)
WBC # BLD AUTO: 7.3 10E3/UL (ref 4–11)

## 2023-07-24 PROCEDURE — 84443 ASSAY THYROID STIM HORMONE: CPT

## 2023-07-24 PROCEDURE — 80053 COMPREHEN METABOLIC PANEL: CPT

## 2023-07-24 PROCEDURE — 83036 HEMOGLOBIN GLYCOSYLATED A1C: CPT

## 2023-07-24 PROCEDURE — 85027 COMPLETE CBC AUTOMATED: CPT

## 2023-07-24 PROCEDURE — 87624 HPV HI-RISK TYP POOLED RSLT: CPT

## 2023-07-24 PROCEDURE — 99396 PREV VISIT EST AGE 40-64: CPT

## 2023-07-24 PROCEDURE — 87389 HIV-1 AG W/HIV-1&-2 AB AG IA: CPT

## 2023-07-24 PROCEDURE — 36415 COLL VENOUS BLD VENIPUNCTURE: CPT

## 2023-07-24 PROCEDURE — G0145 SCR C/V CYTO,THINLAYER,RESCR: HCPCS

## 2023-07-24 PROCEDURE — 86803 HEPATITIS C AB TEST: CPT

## 2023-07-24 PROCEDURE — 80061 LIPID PANEL: CPT

## 2023-07-24 ASSESSMENT — ENCOUNTER SYMPTOMS
JOINT SWELLING: 0
PALPITATIONS: 0
SHORTNESS OF BREATH: 0
SORE THROAT: 0
BREAST MASS: 0
DYSURIA: 0
FEVER: 0
ABDOMINAL PAIN: 0
CONSTIPATION: 0
HEMATURIA: 0
CHILLS: 0
FREQUENCY: 0
NAUSEA: 0
COUGH: 0
HEARTBURN: 0
DIARRHEA: 0
EYE PAIN: 0
MYALGIAS: 0
ARTHRALGIAS: 0
HEMATOCHEZIA: 0
HEADACHES: 0
NERVOUS/ANXIOUS: 0
PARESTHESIAS: 0
DIZZINESS: 0
WEAKNESS: 0

## 2023-07-24 NOTE — ASSESSMENT & PLAN NOTE
Currently smoking 10-15 cigarettes. Has successfully quit with nicotine replacement therapy (the patch) for over a year.  Her daily consumption is significantly less than what has been in the past.  At this point, she would like to continue trying to decrease consumption on her own, however I have encouraged her to reach out if she is in need of any medication management through nicotine replacement as she has done well on this in the past.

## 2023-07-24 NOTE — ASSESSMENT & PLAN NOTE
Annual exam.  Patient has active mammography order and was given the number to schedule this.  She is due for repeating colon cancer screening at the end of this year, and she has elected to pursue Cologuard.  Her last colonoscopy was normal and she has no risk factors that would disqualify her for Cologuard.  Pap smear was updated today; patient plans to discontinue screening after this.  She is eligible for lung cancer screening based on her smoking history, but patient would like to defer at this time and plan to readdress next year.  She is interested in the shingles vaccination, but would like to check with insurance to and plans to obtain this at a pharmacy if appropriate.  BMI is 24; discussed diet and exercise.  She would like a baseline labs done today, so CBC, CMP TSH and hemoglobin A1c were obtained.  HIV/hepatitis C screenings done today.  Discussed calcium and vitamin D as a pertains to bone health.  Follow-up in 1 year for annual exam or sooner with any acute concerns.

## 2023-07-24 NOTE — PATIENT INSTRUCTIONS
Check with insurance about coverage at a pharmacy for the Shingles vaccination.  Cologard for colon cancer  Schedule mammogram - call [phone]  Consider lung cancer screening. We can talk about this and screening for osteoporosis next year    Preventive Health Recommendations  Female Ages 50 - 64    Yearly exam: See your health care provider every year in order to  Review health changes.   Discuss preventive care.    Review your medicines if your doctor has prescribed any.    Get a Pap test every three years (unless you have an abnormal result and your provider advises testing more often).  If you get Pap tests with HPV test, you only need to test every 5 years, unless you have an abnormal result.   You do not need a Pap test if your uterus was removed (hysterectomy) and you have not had cancer.  You should be tested each year for STDs (sexually transmitted diseases) if you're at risk.   Have a mammogram every 1 to 2 years.  Have a colonoscopy at age 50, or have a yearly FIT test (stool test). These exams screen for colon cancer.    Have a cholesterol test every 5 years, or more often if advised.  Have a diabetes test (fasting glucose) every three years. If you are at risk for diabetes, you should have this test more often.   If you are at risk for osteoporosis (brittle bone disease), think about having a bone density scan (DEXA).    Shots: Get a flu shot each year. Get a tetanus shot every 10 years.    Nutrition:   Eat at least 5 servings of fruits and vegetables each day.  Eat whole-grain bread, whole-wheat pasta and brown rice instead of white grains and rice.  Get adequate Calcium and Vitamin D.     Lifestyle  Exercise at least 150 minutes a week (30 minutes a day, 5 days a week). This will help you control your weight and prevent disease.  Limit alcohol to one drink per day.  No smoking.   Wear sunscreen to prevent skin cancer.   See your dentist every six months for an exam and cleaning.  See your eye  doctor every 1 to 2 years.

## 2023-07-24 NOTE — PROGRESS NOTES
SUBJECTIVE:   CC: Loren is an 63 year old who presents for preventive health visit. She has no acute health concerns at today's visit, but would like to catch up with preventative medicine recommendations.        7/24/2023     9:19 AM   Additional Questions   Roomed by ac   Accompanied by self     Healthy Habits:     Getting at least 3 servings of Calcium per day:  Yes    Bi-annual eye exam:  Yes    Dental care twice a year:  Yes    Sleep apnea or symptoms of sleep apnea:  None    Diet:  Regular (no restrictions)    Frequency of exercise:  6-7 days/week    Duration of exercise:  30-45 minutes    Taking medications regularly:  Yes    Medication side effects:  None    Additional concerns today:  No    Walks dog (2 miles)  Swims at night  Not sedentary     Have you ever done Advance Care Planning? (For example, a Health Directive, POLST, or a discussion with a medical provider or your loved ones about your wishes): No, advance care planning information given to patient to review.  Advanced care planning was discussed at today's visit.    Social History     Tobacco Use    Smoking status: Every Day     Packs/day: 1.50     Types: Cigarettes    Smokeless tobacco: Never   Substance Use Topics    Alcohol use: No         7/24/2023     9:10 AM   Alcohol Use   Prescreen: >3 drinks/day or >7 drinks/week? No     Reviewed orders with patient.  Reviewed health maintenance and updated orders accordingly - Yes  Lab work is in process  Labs reviewed in EPIC  BP Readings from Last 3 Encounters:   07/24/23 118/76   06/28/23 137/81   01/23/22 (!) 145/92    Wt Readings from Last 3 Encounters:   07/24/23 72.6 kg (160 lb)   06/28/23 74.4 kg (164 lb)   01/23/22 73 kg (161 lb)              Patient Active Problem List   Diagnosis    Tobacco use    Mixed hyperlipidemia    Seborrheic keratoses    Routine general medical examination at a health care facility     Past Surgical History:   Procedure Laterality Date    HC REMOVAL OF TONSILS,<12  Y/O      Description: Tonsillectomy;  Recorded: 12/09/2013;       Social History     Tobacco Use    Smoking status: Every Day     Packs/day: 1.50     Types: Cigarettes    Smokeless tobacco: Never   Substance Use Topics    Alcohol use: No     Family History   Problem Relation Age of Onset    Lung Cancer Mother     Pneumonia Mother     No Known Problems Father     Uterine Cancer Sister 68    Breast Cancer Sister 68    No Known Problems Sister     No Known Problems Sister     No Known Problems Sister     No Known Problems Brother     No Known Problems Brother     No Known Problems Brother     No Known Problems Brother     Diabetes Type 2  Maternal Grandmother     Colon Cancer No family hx of     Coronary Artery Disease Early Onset No family hx of     Hypertension No family hx of     Hyperlipidemia No family hx of          Current Outpatient Medications   Medication Sig Dispense Refill    ascorbic acid, vitamin C, (VITAMIN C) 500 MG tablet [ASCORBIC ACID, VITAMIN C, (VITAMIN C) 500 MG TABLET] Take 500 mg by mouth.      aspirin 81 mg chewable tablet [ASPIRIN 81 MG CHEWABLE TABLET] Chew 81 mg.      calcium-vitamin D (CALCIUM-VITAMIN D) 500 mg(1,250mg) -200 unit per tablet [CALCIUM-VITAMIN D (CALCIUM-VITAMIN D) 500 MG(1,250MG) -200 UNIT PER TABLET] Take 1 tablet by mouth 2 (two) times a day with meals.      cholecalciferol, vitamin D3, 1,000 unit tablet [CHOLECALCIFEROL, VITAMIN D3, 1,000 UNIT TABLET] Take 1,000 Units by mouth.      ginkgo biloba leaf extract 60 mg Tab [GINKGO BILOBA LEAF EXTRACT 60 MG TAB] Take by mouth.      multivitamin (ONE A DAY) per tablet [MULTIVITAMIN (ONE A DAY) PER TABLET] Take 1 tablet by mouth.      omega-3 acid ethyl esters (LOVAZA) 1 gram capsule [OMEGA-3 ACID ETHYL ESTERS (LOVAZA) 1 GRAM CAPSULE] Take by mouth.      pyridoxine, vitamin B6, (B-6) 100 MG tablet [PYRIDOXINE, VITAMIN B6, (B-6) 100 MG TABLET] Take 50 mg by mouth.      vitamin E 400 UNIT capsule [VITAMIN E 400 UNIT CAPSULE] Take  400 Units by mouth.       No Known Allergies  Recent Labs   Lab Test 07/24/23  1010 08/28/18  1019   A1C 5.5 5.5   LDL  --  173*   HDL  --  65   TRIG  --  71   ALT  --  20   CR  --  0.76   GFRESTIMATED  --  >60   GFRESTBLACK  --  >60   POTASSIUM  --  4.2        Breast Cancer Screening:    [facility]-7:       7/24/2023     9:11 AM   Breast CA Risk Assessment ([facility]HS-7)   Did any of your first-degree relatives have breast or ovarian cancer? Yes   Did any of your relatives have bilateral breast cancer? Unknown   Did any man in your family have breast cancer? No   Did any woman in your family have breast and ovarian cancer? Yes   Did any woman in your family have breast cancer before age 50 y? No   Do you have 2 or more relatives with breast and/or ovarian cancer? No   Do you have 2 or more relatives with breast and/or bowel cancer? No       Mammogram Screening: Recommended mammography every 1-2 years with patient discussion and risk factor consideration  Pertinent mammograms are reviewed under the imaging tab.    History of abnormal Pap smear: NO - age 30-65 PAP every 5 years with negative HPV co-testing recommended      Latest Ref Rng & Units 8/28/2018    10:19 AM   PAP / HPV   PAP  Negative for squamous intraepithelial lesion or malignancy  Electronically signed by Angie Vásquez CT (ASCP) on 9/4/2018 at  1:15 PM      HPV 16 DNA NEG Negative    HPV 18 DNA NEG Negative    Other HR HPV NEG Negative      Reviewed and updated as needed this visit by clinical staff   Tobacco  Allergies  Meds  Problems  Med Hx  Surg Hx  Fam Hx          Reviewed and updated as needed this visit by Provider   Tobacco  Allergies  Meds  Problems  Med Hx  Surg Hx  Fam Hx           Review of Systems   Constitutional:  Negative for chills and fever.   HENT:  Negative for congestion, ear pain, hearing loss and sore throat.    Eyes:  Negative for pain and visual disturbance.   Respiratory:  Negative for cough and shortness of breath.   "  Cardiovascular:  Negative for chest pain, palpitations and peripheral edema.   Gastrointestinal:  Negative for abdominal pain, constipation, diarrhea, heartburn, hematochezia and nausea.   Breasts:  Negative for tenderness, breast mass and discharge.   Genitourinary:  Negative for dysuria, frequency, genital sores, hematuria, pelvic pain, urgency, vaginal bleeding and vaginal discharge.   Musculoskeletal:  Negative for arthralgias, joint swelling and myalgias.   Skin:  Negative for rash.   Neurological:  Negative for dizziness, weakness, headaches and paresthesias.   Psychiatric/Behavioral:  Negative for mood changes. The patient is not nervous/anxious.       OBJECTIVE:   /76 (BP Location: Left arm, Patient Position: Sitting, Cuff Size: Adult Regular)   Pulse 59   Temp 98.1  F (36.7  C) (Oral)   Resp 16   Ht 1.727 m (5' 8\")   Wt 72.6 kg (160 lb)   SpO2 99%   BMI 24.33 kg/m      Physical Exam  GENERAL: healthy, alert and no distress  EYES: Eyes grossly normal to inspection, PERRL and conjunctivae and sclerae normal  HENT: ear canals and TM's normal, nose and mouth without ulcers or lesions  NECK: no adenopathy, no asymmetry, masses, or scars and thyroid normal to palpation  RESP: lungs clear to auscultation - no rales, rhonchi or wheezes  BREAST: Declined by patient secondary to upcoming mammogram; discussed breast awareness  CV: regular rate and rhythm, normal S1 S2, no S3 or S4, no murmur, click or rub, no peripheral edema and peripheral pulses strong  ABDOMEN: soft, nontender, no hepatosplenomegaly, no masses and bowel sounds normal   (female): normal female external genitalia, normal urethral meatus, vaginal mucosa pink, moist, well rugated, and normal cervix/adnexa/uterus without masses or discharge   (female): normal female external genitalia, normal urethral meatus , vaginal mucosal atrophy, and normal cervix, adnexae, and uterus without masses.  MS: no gross musculoskeletal defects noted, " no edema  SKIN: no suspicious lesions or rashes  NEURO: Normal strength and tone, mentation intact and speech normal  PSYCH: mentation appears normal, affect normal/bright    ASSESSMENT/PLAN:     Problem List Items Addressed This Visit          Endocrine    Mixed hyperlipidemia     Not currently on any LDL lowering agents.  We will obtain nonfasting labs today.           Relevant Orders    Lipid Profile (Chol, Trig, HDL, LDL calc)       Behavioral    Tobacco use     Currently smoking 10-15 cigarettes. Has successfully quit with nicotine replacement therapy (the patch) for over a year.  Her daily consumption is significantly less than what has been in the past.  At this point, she would like to continue trying to decrease consumption on her own, however I have encouraged her to reach out if she is in need of any medication management through nicotine replacement as she has done well on this in the past.            Other    Routine general medical examination at a health care facility - Primary     Annual exam.  Patient has active mammography order and was given the number to schedule this.  She is due for repeating colon cancer screening at the end of this year, and she has elected to pursue Cologuard.  Her last colonoscopy was normal and she has no risk factors that would disqualify her for Cologuard.  Pap smear was updated today; patient plans to discontinue screening after this.  She is eligible for lung cancer screening based on her smoking history, but patient would like to defer at this time and plan to readdress next year.  She is interested in the shingles vaccination, but would like to check with insurance to and plans to obtain this at a pharmacy if appropriate.  BMI is 24; discussed diet and exercise.  She would like a baseline labs done today, so CBC, CMP TSH and hemoglobin A1c were obtained.  HIV/hepatitis C screenings done today.  Discussed calcium and vitamin D as a pertains to bone health.  Follow-up  in 1 year for annual exam or sooner with any acute concerns.          Other Visit Diagnoses       Screening for HIV (human immunodeficiency virus)        Relevant Orders    HIV Antigen Antibody Combo    Need for hepatitis C screening test        Relevant Orders    Hepatitis C Screen Reflex to HCV RNA Quant and Genotype    Cervical cancer screening        Relevant Orders    Pap Screen with HPV - recommended age 30 - 65 years    Screening for metabolic disorder        Relevant Orders    Comprehensive metabolic panel (BMP + Alb, Alk Phos, ALT, AST, Total. Bili, TP)    Screening for deficiency anemia        Relevant Orders    CBC with platelets (Completed)    Screening for diabetes mellitus        Relevant Orders    Hemoglobin A1c (Completed)    Screening for thyroid disorder        Relevant Orders    TSH with free T4 reflex    Screen for colon cancer        Relevant Orders    COLOGUARD([employer])          COUNSELING:  Reviewed preventive health counseling, as reflected in patient instructions  Special attention given to:        Regular exercise       Healthy diet/nutrition       Alcohol Use       Osteoporosis prevention/bone health       Colorectal Cancer Screening       Consider Hep C screening for all patients one time for ages 18-79 years       HIV screeninx in teen years, 1x in adult years, and at intervals if high risk       Consider lung cancer screening for ages 55-80 years (77 for Medicare) and 20 pack-year smoking history     She reports that she has been smoking cigarettes. She has been smoking an average of 1.5 packs per day. She has never used smokeless tobacco.    Nicotine/Tobacco Cessation Plan:   Information offered: Patient not interested at this time  Encouraged continued work towards decreasing daily cigarettes             SILVIA Brown CNP  [facility]

## 2023-07-25 LAB
HCV AB SERPL QL IA: NONREACTIVE
HIV 1+2 AB+HIV1 P24 AG SERPL QL IA: NONREACTIVE

## 2023-07-28 LAB
BKR LAB AP GYN ADEQUACY: NORMAL
BKR LAB AP GYN INTERPRETATION: NORMAL
BKR LAB AP HPV REFLEX: NORMAL
BKR LAB AP PREVIOUS ABNORMAL: NORMAL
PATH REPORT.COMMENTS IMP SPEC: NORMAL
PATH REPORT.COMMENTS IMP SPEC: NORMAL
PATH REPORT.RELEVANT HX SPEC: NORMAL

## 2023-07-31 PROBLEM — Z12.4 CERVICAL CANCER SCREENING: Status: ACTIVE | Noted: 2023-07-31

## 2023-07-31 LAB
HUMAN PAPILLOMA VIRUS 16 DNA: NEGATIVE
HUMAN PAPILLOMA VIRUS 18 DNA: NEGATIVE
HUMAN PAPILLOMA VIRUS FINAL DIAGNOSIS: NORMAL
HUMAN PAPILLOMA VIRUS OTHER HR: NEGATIVE

## 2023-08-22 DIAGNOSIS — E83.52 HYPERCALCEMIA: Primary | ICD-10-CM

## 2023-08-24 ENCOUNTER — LAB (OUTPATIENT)
Dept: LAB | Facility: CLINIC | Age: 63
End: 2023-08-24
Payer: COMMERCIAL

## 2023-08-24 DIAGNOSIS — E83.52 HYPERCALCEMIA: ICD-10-CM

## 2023-08-24 LAB
ANION GAP SERPL CALCULATED.3IONS-SCNC: 12 MMOL/L (ref 7–15)
BUN SERPL-MCNC: 13.8 MG/DL (ref 8–23)
CALCIUM SERPL-MCNC: 9.5 MG/DL (ref 8.8–10.2)
CHLORIDE SERPL-SCNC: 101 MMOL/L (ref 98–107)
CREAT SERPL-MCNC: 0.81 MG/DL (ref 0.51–0.95)
DEPRECATED HCO3 PLAS-SCNC: 23 MMOL/L (ref 22–29)
GFR SERPL CREATININE-BSD FRML MDRD: 81 ML/MIN/1.73M2
GLUCOSE SERPL-MCNC: 107 MG/DL (ref 70–99)
POTASSIUM SERPL-SCNC: 4.4 MMOL/L (ref 3.4–5.3)
SODIUM SERPL-SCNC: 136 MMOL/L (ref 136–145)

## 2023-08-24 PROCEDURE — 36415 COLL VENOUS BLD VENIPUNCTURE: CPT

## 2023-08-24 PROCEDURE — 80048 BASIC METABOLIC PNL TOTAL CA: CPT

## 2023-10-05 ENCOUNTER — TELEPHONE (OUTPATIENT)
Dept: FAMILY MEDICINE | Facility: CLINIC | Age: 63
End: 2023-10-05

## 2023-10-05 NOTE — TELEPHONE ENCOUNTER
Patient Quality Outreach    Patient is due for the following:   Colon Cancer Screening  Breast Cancer Screening - Mammogram    Next Steps:   No follow up needed at this time.    Type of outreach:    Sent [employer] message.      Questions for provider review:    None           Dariela Vásquez MA

## 2023-10-21 ENCOUNTER — HEALTH MAINTENANCE LETTER (OUTPATIENT)
Age: 63
End: 2023-10-21

## 2023-11-01 ENCOUNTER — IMMUNIZATION (OUTPATIENT)
Dept: FAMILY MEDICINE | Facility: CLINIC | Age: 63
End: 2023-11-01
Payer: COMMERCIAL

## 2023-11-01 DIAGNOSIS — Z23 ENCOUNTER FOR IMMUNIZATION: Primary | ICD-10-CM

## 2023-11-01 PROCEDURE — 99207 PR NO CHARGE NURSE ONLY: CPT

## 2023-11-01 PROCEDURE — 91320 SARSCV2 VAC 30MCG TRS-SUC IM: CPT

## 2023-11-01 PROCEDURE — 90750 HZV VACC RECOMBINANT IM: CPT

## 2023-11-01 PROCEDURE — 90472 IMMUNIZATION ADMIN EACH ADD: CPT

## 2023-11-01 PROCEDURE — 90682 RIV4 VACC RECOMBINANT DNA IM: CPT

## 2023-11-01 PROCEDURE — 90480 ADMN SARSCOV2 VAC 1/ONLY CMP: CPT

## 2023-11-01 PROCEDURE — 90471 IMMUNIZATION ADMIN: CPT

## 2023-11-01 NOTE — PROGRESS NOTES
Prior to immunization administration, verified patients identity using patient s name and date of birth. Please see Immunization Activity for additional information.     Screening Questionnaire for Adult Immunization    Are you sick today?   No   Do you have allergies to medications, food, a vaccine component or latex?   No   Have you ever had a serious reaction after receiving a vaccination?   No   Do you have a long-term health problem with heart, lung, kidney, or metabolic disease (e.g., diabetes), asthma, a blood disorder, no spleen, complement component deficiency, a cochlear implant, or a spinal fluid leak?  Are you on long-term aspirin therapy?   No   Do you have cancer, leukemia, HIV/AIDS, or any other immune system problem?   No   Do you have a parent, brother, or sister with an immune system problem?   No   In the past 3 months, have you taken medications that affect  your immune system, such as prednisone, other steroids, or anticancer drugs; drugs for the treatment of rheumatoid arthritis, Crohn s disease, or psoriasis; or have you had radiation treatments?   No   Have you had a seizure, or a brain or other nervous system problem?   No   During the past year, have you received a transfusion of blood or blood    products, or been given immune (gamma) globulin or antiviral drug?   No   For women: Are you pregnant or is there a chance you could become       pregnant during the next month?   No   Have you received any vaccinations in the past 4 weeks?   No     Immunization questionnaire answers were all negative.    I have reviewed the following standing orders:   This patient is due and qualifies for the Covid-19 vaccine.     Click here for COVID-19 Standing Order    I have reviewed the vaccines inclusion and exclusion criteria; No concerns regarding eligibility.     This patient is due and qualifies for the Zoster vaccine.    Click here for Zoster Standing Order    I have reviewed the vaccines inclusion  and exclusion criteria; No concerns regarding eligibility.     Patient instructed to remain in clinic for 15 minutes afterwards, and to report any adverse reactions.     Screening performed by Jie Ricks MA on 11/1/2023 at 8:50 AM.

## 2024-01-22 ENCOUNTER — TELEPHONE (OUTPATIENT)
Dept: FAMILY MEDICINE | Facility: CLINIC | Age: 64
End: 2024-01-22

## 2024-01-22 ENCOUNTER — E-VISIT (OUTPATIENT)
Dept: FAMILY MEDICINE | Facility: CLINIC | Age: 64
End: 2024-01-22
Payer: COMMERCIAL

## 2024-01-22 DIAGNOSIS — J20.9 ACUTE BRONCHITIS, UNSPECIFIED ORGANISM: Primary | ICD-10-CM

## 2024-01-22 PROCEDURE — 99421 OL DIG E/M SVC 5-10 MIN: CPT | Performed by: FAMILY MEDICINE

## 2024-01-22 RX ORDER — ALBUTEROL SULFATE 90 UG/1
2 AEROSOL, METERED RESPIRATORY (INHALATION) EVERY 4 HOURS PRN
Qty: 18 G | Refills: 0 | Status: SHIPPED | OUTPATIENT
Start: 2024-01-22

## 2024-01-22 NOTE — TELEPHONE ENCOUNTER
General Call      Reason for Call:  Primary Provider     What are your questions or concerns:  Pt states that she thought Sandra Gardner CNP would be made her primary provider at her Annual Exam on 07/24/23.  She is wondering if Sandra Gardner CNP is willing to switch that without another appointment.    Please advise,      Date of last appointment with provider: 07/24/23    Could we send this information to you in [employer] or would you prefer to receive a phone call?:   Patient would prefer a phone call   Okay to leave a detailed message?: Yes at Cell number on file:    Telephone Information:   Mobile [phone]

## 2024-01-22 NOTE — PATIENT INSTRUCTIONS
Bronchitis: Care Instructions  Overview     Bronchitis is inflammation of the bronchial tubes, which carry air to the lungs. The tubes swell and produce mucus, or phlegm. The mucus and inflamed bronchial tubes make you cough. You may have trouble breathing.  Most cases of bronchitis are caused by viruses like those that cause colds. Antibiotics most often do not help and they may cause harm.  Bronchitis usually develops rapidly and lasts about 2 to 3 weeks in people who are otherwise healthy.  Follow-up care is a key part of your treatment and safety. Be sure to make and go to all appointments, and call your doctor if you are having problems. It's also a good idea to know your test results and keep a list of the medicines you take.  How can you care for yourself at home?  Take all medicines exactly as prescribed. Call your doctor if you think you are having a problem with your medicine.  Get some extra rest.  Take an over-the-counter pain medicine, such as acetaminophen (Tylenol), ibuprofen (Advil, Motrin), or naproxen (Aleve) to reduce fever and relieve body aches. Read and follow all instructions on the label.  Do not take two or more pain medicines at the same time unless the doctor told you to. Many pain medicines have acetaminophen, which is Tylenol. Too much acetaminophen (Tylenol) can be harmful.  Take an over-the-counter cough medicine to help quiet a dry, hacking cough so that you can sleep. Avoid cough medicines that have more than one active ingredient. Read and follow all instructions on the label.  Do not smoke. Smoking can make bronchitis worse. If you need help quitting, talk to your doctor about stop-smoking programs and medicines. These can increase your chances of quitting for good.  When should you call for help?   Call 911 anytime you think you may need emergency care. For example, call if:    You have severe trouble breathing.   Call your doctor now or seek immediate medical care if:    You have  "new or worse trouble breathing.     You cough up dark brown or bloody mucus (sputum).     You have a new or higher fever.     You have a new rash.   Watch closely for changes in your health, and be sure to contact your doctor if:    You cough more deeply or more often, especially if you notice more mucus or a change in the color of your mucus.     You are not getting better as expected.   Where can you learn more?  Go to https://www.[employer].net/patiented  Enter H333 in the search box to learn more about \"Bronchitis: Care Instructions.\"  Current as of: November 13, 2022               Content Version: 13.8    6092-4388 [employer].   Care instructions adapted under license by your healthcare professional. If you have questions about a medical condition or this instruction, always ask your healthcare professional. [employer] disclaims any warranty or liability for your use of this information.      "

## 2024-01-22 NOTE — TELEPHONE ENCOUNTER
Chart review shows patient submitted an E-visit was was given prescription for albuterol in my absence from clinic today. Follow up as needed.

## 2024-01-22 NOTE — TELEPHONE ENCOUNTER
PCP changed to Sandra-she had her Physical with her in July.   Patient also complaining of cough started on Wednesday with congestion. No SOB, No fever but has used albuterol inhaler in past and helps. Would like a refill RX of that? Or NTBS?

## 2024-01-26 ENCOUNTER — OFFICE VISIT (OUTPATIENT)
Dept: FAMILY MEDICINE | Facility: CLINIC | Age: 64
End: 2024-01-26
Payer: COMMERCIAL

## 2024-01-26 VITALS
HEART RATE: 71 BPM | BODY MASS INDEX: 23.57 KG/M2 | OXYGEN SATURATION: 95 % | DIASTOLIC BLOOD PRESSURE: 60 MMHG | TEMPERATURE: 98.2 F | WEIGHT: 155 LBS | RESPIRATION RATE: 16 BRPM | SYSTOLIC BLOOD PRESSURE: 138 MMHG

## 2024-01-26 DIAGNOSIS — Z87.891 PERSONAL HISTORY OF TOBACCO USE, PRESENTING HAZARDS TO HEALTH: ICD-10-CM

## 2024-01-26 DIAGNOSIS — J20.9 ACUTE BRONCHITIS WITH SYMPTOMS > 10 DAYS: Primary | ICD-10-CM

## 2024-01-26 PROBLEM — L82.1 SEBORRHEIC KERATOSES: Status: RESOLVED | Noted: 2023-06-28 | Resolved: 2024-01-26

## 2024-01-26 PROCEDURE — 99204 OFFICE O/P NEW MOD 45 MIN: CPT | Performed by: PHYSICIAN ASSISTANT

## 2024-01-26 RX ORDER — DOXYCYCLINE 100 MG/1
100 TABLET ORAL 2 TIMES DAILY
Qty: 20 TABLET | Refills: 0 | Status: SHIPPED | OUTPATIENT
Start: 2024-01-26 | End: 2024-02-05

## 2024-01-26 RX ORDER — PREDNISONE 20 MG/1
40 TABLET ORAL DAILY
Qty: 10 TABLET | Refills: 0 | Status: SHIPPED | OUTPATIENT
Start: 2024-01-26 | End: 2024-01-31

## 2024-01-26 NOTE — PROGRESS NOTES
Assessment & Plan     Acute bronchitis with symptoms > 10 days  Pt is a tobacco using female with hx of RAD, current exacerbation,  with 3 week hx of cough, congestion, sputum change and wheezing.    Will treat for secondary bacterial infection with doxycycline. Short course of prednisone and encouraged increased albuterol use.  RTC for persistent or worsening sx.   Discussed if not back to normal in 2 - 3 weeks should recheck with pcp.    - predniSONE (DELTASONE) 20 MG tablet  Dispense: 10 tablet; Refill: 0  - doxycycline monohydrate (ADOXA) 100 MG tablet  Dispense: 20 tablet; Refill: 0    Personal history of tobacco use, presenting hazards to health  Encouraged cessation.    She has not been able to use tobacco due to illness, which is possible why she is having RAD exacerbation.  RTC for persistent or worsening sx.         MARITZA [facility]    Alvin Pimentel is a 63 year old female who presents to clinic today for the following health issues:  Chief Complaint   Patient presents with    Cough     Cough and congestion x 17 days.     HPI    Started with sinus sx, then into the chest.   Ribs hurt.   Albuterol: stopped using it, used 5-6 x and got nausea, burning, increased cough.    No fevers.    Mucinex helpful.    Thick yellow.    Sob with coughing spells.    Increased cough at night keeps up.    Pain in chest with cough primarily.   Wheezy at times.    Covid 19 x 2 negative.     Lots of nasal discharge, no facial pain or tooth pain. Some HA.          Review of Systems  Constitutional, HEENT, cardiovascular, pulmonary, gi and gu systems are negative, except as otherwise noted.      Patient Active Problem List   Diagnosis    Tobacco use    Vaginal atrophy    Mixed hyperlipidemia    Routine general medical examination at a health care facility    Cervical cancer screening    Cigarette smoker    Menopausal symptoms     Current Outpatient Medications    Medication    albuterol (PROAIR HFA/PROVENTIL HFA/VENTOLIN HFA) 108 (90 Base) MCG/ACT inhaler    ascorbic acid, vitamin C, (VITAMIN C) 500 MG tablet    aspirin 81 mg chewable tablet    cholecalciferol, vitamin D3, 1,000 unit tablet    doxycycline monohydrate (ADOXA) 100 MG tablet    ginkgo biloba leaf extract 60 mg Tab    multivitamin (ONE A DAY) per tablet    omega-3 acid ethyl esters (LOVAZA) 1 gram capsule    predniSONE (DELTASONE) 20 MG tablet    pyridoxine, vitamin B6, (B-6) 100 MG tablet    vitamin E 400 UNIT capsule     No current facility-administered medications for this visit.       Objective    /60   Pulse 71   Temp 98.2  F (36.8  C)   Resp 16   Wt 70.3 kg (155 lb)   SpO2 95%   BMI 23.57 kg/m    Physical Exam   Pt is in no acute distress and appears well  Ears patent B:  TM s intact, non-injected. All land marks easily visibile    Nasal mucosa is non-edematous, no discharge.    Pharynx: non erythematous, tonsils non hypertrophied, No exudate   Neck supple: no adenopathy  Lungs: CTA  Heart: RRR, no murmur, no thrills or heaves   Ext: no edema  Skin: no rashes      No results found for any visits on 01/26/24.

## 2024-02-13 ENCOUNTER — ALLIED HEALTH/NURSE VISIT (OUTPATIENT)
Dept: FAMILY MEDICINE | Facility: CLINIC | Age: 64
End: 2024-02-13
Payer: COMMERCIAL

## 2024-02-13 DIAGNOSIS — Z23 ENCOUNTER FOR IMMUNIZATION: Primary | ICD-10-CM

## 2024-02-13 PROCEDURE — 90750 HZV VACC RECOMBINANT IM: CPT

## 2024-02-13 PROCEDURE — 90678 RSV VACC PREF BIVALENT IM: CPT

## 2024-02-13 PROCEDURE — 90472 IMMUNIZATION ADMIN EACH ADD: CPT

## 2024-02-13 PROCEDURE — 99207 PR NO CHARGE NURSE ONLY: CPT

## 2024-02-13 PROCEDURE — 90471 IMMUNIZATION ADMIN: CPT

## 2024-02-13 NOTE — PROGRESS NOTES
Prior to immunization administration, verified patients identity using patient s name and date of birth. Please see Immunization Activity for additional information.     Screening Questionnaire for Adult Immunization    Are you sick today?   No   Do you have allergies to medications, food, a vaccine component or latex?   No   Have you ever had a serious reaction after receiving a vaccination?   No   Do you have a long-term health problem with heart, lung, kidney, or metabolic disease (e.g., diabetes), asthma, a blood disorder, no spleen, complement component deficiency, a cochlear implant, or a spinal fluid leak?  Are you on long-term aspirin therapy?   No   Do you have cancer, leukemia, HIV/AIDS, or any other immune system problem?   No   Do you have a parent, brother, or sister with an immune system problem?   No   In the past 3 months, have you taken medications that affect  your immune system, such as prednisone, other steroids, or anticancer drugs; drugs for the treatment of rheumatoid arthritis, Crohn s disease, or psoriasis; or have you had radiation treatments?   No   Have you had a seizure, or a brain or other nervous system problem?   No   During the past year, have you received a transfusion of blood or blood    products, or been given immune (gamma) globulin or antiviral drug?   No   For women: Are you pregnant or is there a chance you could become       pregnant during the next month?   No   Have you received any vaccinations in the past 4 weeks?   No     Immunization questionnaire answers were all negative.    I have reviewed the following standing orders:   This patient is due and qualifies for the RSV vaccine.    Click here for RSV Vaccine Standing Order    I have reviewed the vaccines inclusion and exclusion criteria; No concerns regarding eligibility.         This patient is due and qualifies for the Zoster vaccine.    Click here for Zoster Standing Order    I have reviewed the vaccines inclusion  and exclusion criteria; No concerns regarding eligibility.     Patient instructed to remain in clinic for 15 minutes afterwards, and to report any adverse reactions.     Screening performed by Tesfaye Champagne RN on 2/13/2024 at 8:58 AM.

## 2024-10-05 ENCOUNTER — HEALTH MAINTENANCE LETTER (OUTPATIENT)
Age: 64
End: 2024-10-05

## 2025-03-13 ENCOUNTER — TELEPHONE (OUTPATIENT)
Dept: URGENT CARE | Facility: URGENT CARE | Age: 65
End: 2025-03-13

## 2025-03-13 ENCOUNTER — OFFICE VISIT (OUTPATIENT)
Dept: URGENT CARE | Facility: URGENT CARE | Age: 65
End: 2025-03-13
Payer: COMMERCIAL

## 2025-03-13 VITALS
TEMPERATURE: 97.8 F | DIASTOLIC BLOOD PRESSURE: 77 MMHG | BODY MASS INDEX: 22.81 KG/M2 | WEIGHT: 150 LBS | RESPIRATION RATE: 16 BRPM | SYSTOLIC BLOOD PRESSURE: 113 MMHG | OXYGEN SATURATION: 95 % | HEART RATE: 59 BPM

## 2025-03-13 DIAGNOSIS — M25.562 ACUTE PAIN OF LEFT KNEE: Primary | ICD-10-CM

## 2025-03-13 LAB — D DIMER PPP FEU-MCNC: 0.48 UG/ML FEU (ref 0–0.5)

## 2025-03-13 NOTE — PROGRESS NOTES
OUTPATIENT VISIT NOTE                                                   Date of Visit: 3/13/2025     Chief Complaint   Patient presents with:  Leg Problem: Right leg pain behind right knee. Fell asleep on a recliner and laid her leg on metal bar for over an hour last Monday. Patient now has swelling redness and also feels warm.            History of Present Illness   Loren Gilliland is a 65 year old female with sister c/o pain behind right knee.  Fell asleep in a recliner with a metal bar right behind her knee.  Woke up with pain behind the knee.  Heating pad helps.  Ache all the time.  No shortness of breath.  Looks a little puffy       MEDICATIONS   Current Outpatient Medications   Medication Sig Dispense Refill    albuterol (PROAIR HFA/PROVENTIL HFA/VENTOLIN HFA) 108 (90 Base) MCG/ACT inhaler Inhale 2 puffs into the lungs every 4 hours as needed for shortness of breath, wheezing or cough 18 g 0    ascorbic acid, vitamin C, (VITAMIN C) 500 MG tablet [ASCORBIC ACID, VITAMIN C, (VITAMIN C) 500 MG TABLET] Take 500 mg by mouth.      aspirin 81 mg chewable tablet [ASPIRIN 81 MG CHEWABLE TABLET] Chew 81 mg.      cholecalciferol, vitamin D3, 1,000 unit tablet [CHOLECALCIFEROL, VITAMIN D3, 1,000 UNIT TABLET] Take 1,000 Units by mouth.      ginkgo biloba leaf extract 60 mg Tab [GINKGO BILOBA LEAF EXTRACT 60 MG TAB] Take by mouth.      multivitamin (ONE A DAY) per tablet [MULTIVITAMIN (ONE A DAY) PER TABLET] Take 1 tablet by mouth.      omega-3 acid ethyl esters (LOVAZA) 1 gram capsule [OMEGA-3 ACID ETHYL ESTERS (LOVAZA) 1 GRAM CAPSULE] Take by mouth.      pyridoxine, vitamin B6, (B-6) 100 MG tablet [PYRIDOXINE, VITAMIN B6, (B-6) 100 MG TABLET] Take 50 mg by mouth.      vitamin E 400 UNIT capsule [VITAMIN E 400 UNIT CAPSULE] Take 400 Units by mouth.       No current facility-administered medications for this visit.         SOCIAL HISTORY   Social History     Tobacco Use    Smoking status: Every Day     Current packs/day:  1.50     Types: Cigarettes    Smokeless tobacco: Never   Substance Use Topics    Alcohol use: No           Physical Exam   Vitals:    03/13/25 1008   BP: 113/77   Pulse: 59   Resp: 16   Temp: 97.8  F (36.6  C)   SpO2: 95%   Weight: 68 kg (150 lb)        GEN:  NAD  KNEES:  BL--No effusion, no erythema,  no ecchymosis. Slight swelling over pes anserine  Pain:  tenderness left medial knee over pes anserine.  ROM: full  McMurrays: negative  Anterior Drawer: negative  Ligamental laxity: none     Leg measurements: right calf 6.0 cm below knee is 43.5 cm girth; left calf6.0 cm below knee is 42.5 cm girth.           Assessment and Plan     Acute pain of left knee  No risk factors for DVT, but has been more sedentary recently with  in the hospital.  Of note is a smoker, although trying to quit.  Will check d dimer  Most likely pes anserine bursitis due to the pressure on the area.  Ice frequently.  May apply heat after.  Ibuprofen 400 mg tid as needed.  Knee exercises described  - D dimer quantitative             Will notify of d dimer result.    Discussed signs / symptoms that warrant urgent / emergent medical attention.   Recheck if worsening or not improving.       Heron Amato MD          Pertinent History     The following portions of the patient's history were reviewed and updated as appropriate: allergies, current medications, past family history, past medical history, past social history, past surgical history and problem list.

## 2025-03-13 NOTE — PATIENT INSTRUCTIONS
Ibuprofen 2 200mg tablets up to three times a day.    Ice the area frequently.  May apply heat after ice.    Knee exercises as described.

## 2025-03-13 NOTE — TELEPHONE ENCOUNTER
Rn spoke with pt and relayed results. Pt verbalized understanding and had no further questions.    Kristine HORTON RN